# Patient Record
Sex: MALE | Race: ASIAN | URBAN - METROPOLITAN AREA
[De-identification: names, ages, dates, MRNs, and addresses within clinical notes are randomized per-mention and may not be internally consistent; named-entity substitution may affect disease eponyms.]

---

## 2017-02-08 ENCOUNTER — APPOINTMENT (OUTPATIENT)
Dept: GENERAL RADIOLOGY | Facility: CLINIC | Age: 37
DRG: 683 | End: 2017-02-08
Attending: PHYSICIAN ASSISTANT
Payer: OTHER MISCELLANEOUS

## 2017-02-08 ENCOUNTER — HOSPITAL ENCOUNTER (INPATIENT)
Facility: CLINIC | Age: 37
LOS: 1 days | Discharge: HOME | DRG: 683 | End: 2017-02-09
Attending: PHYSICIAN ASSISTANT | Admitting: INTERNAL MEDICINE
Payer: OTHER MISCELLANEOUS

## 2017-02-08 DIAGNOSIS — G89.29 CHRONIC RIGHT-SIDED BACK PAIN, UNSPECIFIED BACK LOCATION: ICD-10-CM

## 2017-02-08 DIAGNOSIS — N17.9 ACUTE RENAL FAILURE, UNSPECIFIED ACUTE RENAL FAILURE TYPE (H): ICD-10-CM

## 2017-02-08 DIAGNOSIS — E11.8 TYPE 2 DIABETES MELLITUS WITH COMPLICATION, WITHOUT LONG-TERM CURRENT USE OF INSULIN (H): Primary | ICD-10-CM

## 2017-02-08 DIAGNOSIS — M54.9 CHRONIC RIGHT-SIDED BACK PAIN, UNSPECIFIED BACK LOCATION: ICD-10-CM

## 2017-02-08 DIAGNOSIS — I95.1 ORTHOSTATIC HYPOTENSION: ICD-10-CM

## 2017-02-08 LAB
ALBUMIN SERPL-MCNC: 4 G/DL (ref 3.4–5)
ALBUMIN UR-MCNC: ABNORMAL MG/DL
ALP SERPL-CCNC: 60 U/L (ref 40–150)
ALT SERPL W P-5'-P-CCNC: 27 U/L (ref 0–70)
ANION GAP SERPL CALCULATED.3IONS-SCNC: 14 MMOL/L (ref 3–14)
APAP SERPL-MCNC: NORMAL MG/L (ref 10–20)
APPEARANCE UR: CLEAR
AST SERPL W P-5'-P-CCNC: 13 U/L (ref 0–45)
BASOPHILS # BLD AUTO: 0 10E9/L (ref 0–0.2)
BASOPHILS NFR BLD AUTO: 0.1 %
BILIRUB DIRECT SERPL-MCNC: 0.1 MG/DL (ref 0–0.2)
BILIRUB SERPL-MCNC: 0.7 MG/DL (ref 0.2–1.3)
BILIRUB UR QL STRIP: NEGATIVE
BUN SERPL-MCNC: 30 MG/DL (ref 7–30)
CALCIUM SERPL-MCNC: 8.6 MG/DL (ref 8.5–10.1)
CAOX CRY #/AREA URNS HPF: ABNORMAL /HPF
CHLORIDE SERPL-SCNC: 105 MMOL/L (ref 94–109)
CO2 SERPL-SCNC: 19 MMOL/L (ref 20–32)
COLOR UR AUTO: YELLOW
CREAT SERPL-MCNC: 5.03 MG/DL (ref 0.66–1.25)
DIFFERENTIAL METHOD BLD: ABNORMAL
EOSINOPHIL # BLD AUTO: 0.1 10E9/L (ref 0–0.7)
EOSINOPHIL NFR BLD AUTO: 0.3 %
ERYTHROCYTE [DISTWIDTH] IN BLOOD BY AUTOMATED COUNT: 14.7 % (ref 10–15)
GFR SERPL CREATININE-BSD FRML MDRD: 13 ML/MIN/1.7M2
GLUCOSE SERPL-MCNC: 206 MG/DL (ref 70–99)
GLUCOSE UR STRIP-MCNC: NEGATIVE MG/DL
HCT VFR BLD AUTO: 38.1 % (ref 40–53)
HGB BLD-MCNC: 12.4 G/DL (ref 13.3–17.7)
HGB UR QL STRIP: NEGATIVE
HYALINE CASTS #/AREA URNS LPF: ABNORMAL /LPF (ref 0–2)
IMM GRANULOCYTES # BLD: 0.1 10E9/L (ref 0–0.4)
IMM GRANULOCYTES NFR BLD: 0.6 %
INTERPRETATION ECG - MUSE: NORMAL
KETONES BLD-SCNC: 0.1 MMOL/L (ref 0–0.6)
KETONES UR STRIP-MCNC: NEGATIVE MG/DL
LACTATE BLD-SCNC: 1.7 MMOL/L (ref 0.7–2.1)
LEUKOCYTE ESTERASE UR QL STRIP: NEGATIVE
LYMPHOCYTES # BLD AUTO: 3.6 10E9/L (ref 0.8–5.3)
LYMPHOCYTES NFR BLD AUTO: 24.7 %
MCH RBC QN AUTO: 33.2 PG (ref 26.5–33)
MCHC RBC AUTO-ENTMCNC: 32.5 G/DL (ref 31.5–36.5)
MCV RBC AUTO: 102 FL (ref 78–100)
MONOCYTES # BLD AUTO: 1 10E9/L (ref 0–1.3)
MONOCYTES NFR BLD AUTO: 6.6 %
MUCOUS THREADS #/AREA URNS LPF: PRESENT /LPF
NEUTROPHILS # BLD AUTO: 9.8 10E9/L (ref 1.6–8.3)
NEUTROPHILS NFR BLD AUTO: 67.7 %
NITRATE UR QL: NEGATIVE
NRBC # BLD AUTO: 0 10*3/UL
NRBC BLD AUTO-RTO: 0 /100
PH UR STRIP: 5 PH (ref 5–7)
PLATELET # BLD AUTO: 288 10E9/L (ref 150–450)
POTASSIUM SERPL-SCNC: 4.3 MMOL/L (ref 3.4–5.3)
PROT SERPL-MCNC: 7.7 G/DL (ref 6.8–8.8)
RBC # BLD AUTO: 3.73 10E12/L (ref 4.4–5.9)
RBC #/AREA URNS AUTO: ABNORMAL /HPF (ref 0–2)
RENAL EPI CELLS #/AREA URNS HPF: ABNORMAL /HPF
SALICYLATES SERPL-MCNC: 3 MG/DL
SODIUM SERPL-SCNC: 138 MMOL/L (ref 133–144)
SP GR UR STRIP: 1.02 (ref 1–1.03)
URN SPEC COLLECT METH UR: ABNORMAL
UROBILINOGEN UR STRIP-ACNC: 0.2 EU/DL (ref 0.2–1)
WBC # BLD AUTO: 14.5 10E9/L (ref 4–11)
WBC #/AREA URNS AUTO: ABNORMAL /HPF (ref 0–2)

## 2017-02-08 PROCEDURE — 80329 ANALGESICS NON-OPIOID 1 OR 2: CPT | Performed by: PHYSICIAN ASSISTANT

## 2017-02-08 PROCEDURE — 96360 HYDRATION IV INFUSION INIT: CPT

## 2017-02-08 PROCEDURE — 82010 KETONE BODYS QUAN: CPT | Performed by: PHYSICIAN ASSISTANT

## 2017-02-08 PROCEDURE — 80076 HEPATIC FUNCTION PANEL: CPT | Performed by: PHYSICIAN ASSISTANT

## 2017-02-08 PROCEDURE — 80048 BASIC METABOLIC PNL TOTAL CA: CPT | Performed by: PHYSICIAN ASSISTANT

## 2017-02-08 PROCEDURE — 25000128 H RX IP 250 OP 636: Performed by: PHYSICIAN ASSISTANT

## 2017-02-08 PROCEDURE — 85025 COMPLETE CBC W/AUTO DIFF WBC: CPT | Performed by: PHYSICIAN ASSISTANT

## 2017-02-08 PROCEDURE — 83605 ASSAY OF LACTIC ACID: CPT | Performed by: PHYSICIAN ASSISTANT

## 2017-02-08 PROCEDURE — 99285 EMERGENCY DEPT VISIT HI MDM: CPT | Mod: 25

## 2017-02-08 PROCEDURE — 93005 ELECTROCARDIOGRAM TRACING: CPT

## 2017-02-08 PROCEDURE — 96361 HYDRATE IV INFUSION ADD-ON: CPT

## 2017-02-08 PROCEDURE — 25000128 H RX IP 250 OP 636: Performed by: INTERNAL MEDICINE

## 2017-02-08 PROCEDURE — 71020 XR CHEST 2 VW: CPT

## 2017-02-08 PROCEDURE — 99223 1ST HOSP IP/OBS HIGH 75: CPT | Mod: AI | Performed by: INTERNAL MEDICINE

## 2017-02-08 PROCEDURE — 81001 URINALYSIS AUTO W/SCOPE: CPT | Performed by: PHYSICIAN ASSISTANT

## 2017-02-08 PROCEDURE — 25000132 ZZH RX MED GY IP 250 OP 250 PS 637: Performed by: PHYSICIAN ASSISTANT

## 2017-02-08 PROCEDURE — 12000007 ZZH R&B INTERMEDIATE

## 2017-02-08 RX ORDER — DIVALPROEX SODIUM 500 MG/1
1500 TABLET, EXTENDED RELEASE ORAL AT BEDTIME
COMMUNITY

## 2017-02-08 RX ORDER — ACETAMINOPHEN 650 MG/1
650 SUPPOSITORY RECTAL EVERY 4 HOURS PRN
Status: DISCONTINUED | OUTPATIENT
Start: 2017-02-08 | End: 2017-02-09 | Stop reason: HOSPADM

## 2017-02-08 RX ORDER — NICOTINE POLACRILEX 4 MG
15-30 LOZENGE BUCCAL
Status: DISCONTINUED | OUTPATIENT
Start: 2017-02-08 | End: 2017-02-09 | Stop reason: HOSPADM

## 2017-02-08 RX ORDER — DEXTROSE MONOHYDRATE 25 G/50ML
25-50 INJECTION, SOLUTION INTRAVENOUS
Status: DISCONTINUED | OUTPATIENT
Start: 2017-02-08 | End: 2017-02-09 | Stop reason: HOSPADM

## 2017-02-08 RX ORDER — ONDANSETRON 2 MG/ML
4 INJECTION INTRAMUSCULAR; INTRAVENOUS EVERY 6 HOURS PRN
Status: DISCONTINUED | OUTPATIENT
Start: 2017-02-08 | End: 2017-02-09 | Stop reason: HOSPADM

## 2017-02-08 RX ORDER — PROCHLORPERAZINE 25 MG
25 SUPPOSITORY, RECTAL RECTAL EVERY 12 HOURS PRN
Status: DISCONTINUED | OUTPATIENT
Start: 2017-02-08 | End: 2017-02-09 | Stop reason: HOSPADM

## 2017-02-08 RX ORDER — SODIUM CHLORIDE 9 MG/ML
INJECTION, SOLUTION INTRAVENOUS CONTINUOUS
Status: DISCONTINUED | OUTPATIENT
Start: 2017-02-08 | End: 2017-02-09

## 2017-02-08 RX ORDER — RAMELTEON 8 MG/1
8 TABLET ORAL AT BEDTIME
COMMUNITY

## 2017-02-08 RX ORDER — PROCHLORPERAZINE MALEATE 5 MG
5-10 TABLET ORAL EVERY 6 HOURS PRN
Status: DISCONTINUED | OUTPATIENT
Start: 2017-02-08 | End: 2017-02-09 | Stop reason: HOSPADM

## 2017-02-08 RX ORDER — QUETIAPINE FUMARATE 300 MG/1
600 TABLET, FILM COATED ORAL AT BEDTIME
Status: DISCONTINUED | OUTPATIENT
Start: 2017-02-08 | End: 2017-02-09 | Stop reason: HOSPADM

## 2017-02-08 RX ORDER — OXYCODONE HYDROCHLORIDE 5 MG/1
5 TABLET ORAL ONCE
Status: COMPLETED | OUTPATIENT
Start: 2017-02-08 | End: 2017-02-08

## 2017-02-08 RX ORDER — OXYCODONE HYDROCHLORIDE 5 MG/1
5-10 TABLET ORAL
Status: DISCONTINUED | OUTPATIENT
Start: 2017-02-08 | End: 2017-02-09 | Stop reason: HOSPADM

## 2017-02-08 RX ORDER — NALOXONE HYDROCHLORIDE 0.4 MG/ML
.1-.4 INJECTION, SOLUTION INTRAMUSCULAR; INTRAVENOUS; SUBCUTANEOUS
Status: DISCONTINUED | OUTPATIENT
Start: 2017-02-08 | End: 2017-02-09 | Stop reason: HOSPADM

## 2017-02-08 RX ORDER — ACETAMINOPHEN 160 MG
TABLET,DISINTEGRATING ORAL
Status: DISPENSED
Start: 2017-02-08 | End: 2017-02-09

## 2017-02-08 RX ORDER — RAMELTEON 8 MG/1
8 TABLET ORAL AT BEDTIME
Status: DISCONTINUED | OUTPATIENT
Start: 2017-02-08 | End: 2017-02-09 | Stop reason: HOSPADM

## 2017-02-08 RX ORDER — ONDANSETRON 4 MG/1
4 TABLET, ORALLY DISINTEGRATING ORAL EVERY 6 HOURS PRN
Status: DISCONTINUED | OUTPATIENT
Start: 2017-02-08 | End: 2017-02-09 | Stop reason: HOSPADM

## 2017-02-08 RX ORDER — ACETAMINOPHEN 325 MG/1
650 TABLET ORAL EVERY 4 HOURS PRN
Status: DISCONTINUED | OUTPATIENT
Start: 2017-02-08 | End: 2017-02-09 | Stop reason: HOSPADM

## 2017-02-08 RX ORDER — DIVALPROEX SODIUM 500 MG/1
1500 TABLET, EXTENDED RELEASE ORAL AT BEDTIME
Status: DISCONTINUED | OUTPATIENT
Start: 2017-02-08 | End: 2017-02-09 | Stop reason: HOSPADM

## 2017-02-08 RX ADMIN — SODIUM CHLORIDE 1000 ML: 9 INJECTION, SOLUTION INTRAVENOUS at 18:52

## 2017-02-08 RX ADMIN — SODIUM CHLORIDE 1000 ML: 9 INJECTION, SOLUTION INTRAVENOUS at 20:30

## 2017-02-08 RX ADMIN — SODIUM CHLORIDE: 9 INJECTION, SOLUTION INTRAVENOUS at 23:48

## 2017-02-08 RX ADMIN — OXYCODONE HYDROCHLORIDE 5 MG: 5 TABLET ORAL at 22:46

## 2017-02-08 ASSESSMENT — ENCOUNTER SYMPTOMS
DIFFICULTY URINATING: 0
SHORTNESS OF BREATH: 0
COUGH: 0
FEVER: 0
DIZZINESS: 0
HEMATURIA: 0
FREQUENCY: 0
LIGHT-HEADEDNESS: 0
DYSURIA: 0

## 2017-02-08 NOTE — IP AVS SNAPSHOT
MRN:[id number]                      After Visit Summary   2/8/2017    Bobby Osborne    MRN: [id number]           Thank you!     Thank you for choosing [facility] for your care. Our goal is always to provide you with excellent care. Hearing back from our patients is one way we can continue to improve our services. Please take a few minutes to complete the written survey that you may receive in the mail after you visit with us. Thank you!        Patient Information     Date Of Birth          1980        About your hospital stay     You were admitted on:  February 8, 2017 You last received care in the:  [address] [facility]    You were discharged on:  February 9, 2017        Reason for your hospital stay       You were admitted to the hospital due to kidney failure. It is currently improving. You will need follow up with your primary care for blood work next week. Avoid using NSAIDs (naproxen, motrin....). Your metformin is also stopped at time of discharge until you follow up with your primary care doctor and blood work showing improved kidney function.                  Who to Call     For medical emergencies, please call 911.  For non-urgent questions about your medical care, please call your primary care provider or clinic, [phone]          Attending Provider     Provider    Elba Salas PA-C Melander, Ian M, MD       Primary Care Provider Office Phone # Fax #    Oleg Bo [phone]       [facility] [address] [facility] [address]  [facility] [address]        After Care Instructions     Activity       Your activity upon discharge: activity as tolerated            Diet       Follow this diet upon discharge: low carbohydrate diet                  Follow-up Appointments     Follow-up and recommended labs and tests        Follow up with primary care provider, OLEG BO, within 3-5 days for hospital follow- up.  The following labs/tests are  "recommended: basic metabolic panel to ensure kidney function is improving.  Also discuss with your primary care doctor when you can go back to your metformin.                  Pending Results     No orders found for last 2 day(s).            Statement of Approval     Ordered          17 1521  I have reviewed and agree with all the recommendations and orders detailed in this document.   EFFECTIVE NOW     Approved and electronically signed by:  Mary Summers MD             Admission Information        Provider Department Dept Phone    2017 Santiago Adame MD  [facility] [phone]      Your Vitals Were     Blood Pressure Pulse Temperature    120/79 mmHg 99 98.2  F (36.8  C) (Oral)    Respirations Height Weight    16 1.702 m (5' 7\") 96.163 kg (212 lb)    BMI (Body Mass Index) Pulse Oximetry       33.20 kg/m2 98%       MyChart Information     [employer] lets you send messages to your doctor, view your test results, renew your prescriptions, schedule appointments and more. To sign up, go to [email or web address] . Click on \"Log in\" on the left side of the screen, which will take you to the Welcome page. Then click on \"Sign up Now\" on the right side of the page.     You will be asked to enter the access code listed below, as well as some personal information. Please follow the directions to create your username and password.     Your access code is: [email or web address]  Expires: 2017 11:05 PM     Your access code will  in 90 days. If you need help or a new code, please call your [place] clinic or [phone].        Care EveryWhere ID     This is your Care EveryWhere ID. This could be used by other organizations to access your [place] medical records  [id number]           Review of your medicines      CONTINUE these medicines which may have CHANGED, or have new prescriptions. If we are uncertain of the size of tablets/capsules you have at home, strength may be listed as something that " might have changed.        Dose / Directions    glipiZIDE 5 MG 24 hr tablet   Commonly known as:  glipiZIDE XL   This may have changed:    - medication strength  - how much to take  - when to take this   Used for:  Type 2 diabetes mellitus with complication, without long-term current use of insulin (H)        Dose:  5 mg   Take 1 tablet (5 mg) by mouth daily   Quantity:  30 tablet   Refills:  0         CONTINUE these medicines which have NOT CHANGED        Dose / Directions    divalproex 500 MG 24 hr tablet   Commonly known as:  DEPAKOTE ER        Dose:  1500 mg   Take 1,500 mg by mouth At Bedtime   Refills:  0       OXYCODONE HCL PO        Dose:  5 mg   Take 5 mg by mouth every 6 hours as needed (pain. max of 4/day)   Refills:  0       ramelteon 8 MG tablet   Commonly known as:  ROZEREM        Dose:  8 mg   Take 8 mg by mouth At Bedtime   Refills:  0       SEROQUEL PO        Dose:  600 mg   Take 600 mg by mouth At Bedtime   Refills:  0         STOP taking     METFORMIN HCL PO           NAPROXEN PO                Where to get your medicines      These medications were sent to [id number] - [place], [place] - [address], [place] [address]     Phone:  [phone]    - glipiZIDE 5 MG 24 hr tablet             Protect others around you: Learn how to safely use, store and throw away your medicines at www.disposemymeds.org.             Medication List: This is a list of all your medications and when to take them. Check marks below indicate your daily home schedule. Keep this list as a reference.      Medications           Morning Afternoon Evening Bedtime As Needed    divalproex 500 MG 24 hr tablet   Commonly known as:  DEPAKOTE ER   Take 1,500 mg by mouth At Bedtime   Last time this was given:  1,500 mg on 2/9/2017 12:51 AM                                glipiZIDE 5 MG 24 hr tablet   Commonly known as:  glipiZIDE XL   Take 1 tablet (5 mg) by mouth daily                                 OXYCODONE HCL PO   Take 5 mg by mouth every 6 hours as needed (pain. max of 4/day)   Last time this was given:  5 mg on 2/9/2017 12:28 PM                                ramelteon 8 MG tablet   Commonly known as:  ROZEREM   Take 8 mg by mouth At Bedtime   Last time this was given:  8 mg on 2/9/2017 12:52 AM                                SEROQUEL PO   Take 600 mg by mouth At Bedtime   Last time this was given:  600 mg on 2/9/2017 12:51 AM

## 2017-02-08 NOTE — IP AVS SNAPSHOT
[address] [facility]    [address] [place] [address]    Phone:  [phone]                                       After Visit Summary   2/8/2017    Bobby Osborne    MRN: [id number]           After Visit Summary Signature Page     I have received my discharge instructions, and my questions have been answered. I have discussed any challenges I see with this plan with the nurse or doctor.    ..........................................................................................................................................  Patient/Patient Representative Signature      ..........................................................................................................................................  Patient Representative Print Name and Relationship to Patient    ..................................................               ................................................  Date                                            Time    ..........................................................................................................................................  Reviewed by Signature/Title    ...................................................              ..............................................  Date                                                            Time

## 2017-02-09 ENCOUNTER — APPOINTMENT (OUTPATIENT)
Dept: ULTRASOUND IMAGING | Facility: CLINIC | Age: 37
DRG: 683 | End: 2017-02-09
Attending: INTERNAL MEDICINE
Payer: OTHER MISCELLANEOUS

## 2017-02-09 VITALS
WEIGHT: 212 LBS | DIASTOLIC BLOOD PRESSURE: 79 MMHG | OXYGEN SATURATION: 98 % | RESPIRATION RATE: 16 BRPM | TEMPERATURE: 98.2 F | HEART RATE: 99 BPM | HEIGHT: 67 IN | BODY MASS INDEX: 33.27 KG/M2 | SYSTOLIC BLOOD PRESSURE: 120 MMHG

## 2017-02-09 LAB
ANION GAP SERPL CALCULATED.3IONS-SCNC: 9 MMOL/L (ref 3–14)
BUN SERPL-MCNC: 26 MG/DL (ref 7–30)
CALCIUM SERPL-MCNC: 8.1 MG/DL (ref 8.5–10.1)
CHLORIDE SERPL-SCNC: 115 MMOL/L (ref 94–109)
CO2 SERPL-SCNC: 21 MMOL/L (ref 20–32)
CREAT SERPL-MCNC: 2.34 MG/DL (ref 0.66–1.25)
ERYTHROCYTE [DISTWIDTH] IN BLOOD BY AUTOMATED COUNT: 14.8 % (ref 10–15)
GFR SERPL CREATININE-BSD FRML MDRD: 31 ML/MIN/1.7M2
GLUCOSE BLDC GLUCOMTR-MCNC: 126 MG/DL (ref 70–99)
GLUCOSE BLDC GLUCOMTR-MCNC: 137 MG/DL (ref 70–99)
GLUCOSE BLDC GLUCOMTR-MCNC: 150 MG/DL (ref 70–99)
GLUCOSE SERPL-MCNC: 74 MG/DL (ref 70–99)
HBA1C MFR BLD: 6.8 % (ref 4.3–6)
HCT VFR BLD AUTO: 31.2 % (ref 40–53)
HGB BLD-MCNC: 10.1 G/DL (ref 13.3–17.7)
MCH RBC QN AUTO: 32.9 PG (ref 26.5–33)
MCHC RBC AUTO-ENTMCNC: 32.4 G/DL (ref 31.5–36.5)
MCV RBC AUTO: 102 FL (ref 78–100)
PLATELET # BLD AUTO: 231 10E9/L (ref 150–450)
POTASSIUM SERPL-SCNC: 4.8 MMOL/L (ref 3.4–5.3)
RBC # BLD AUTO: 3.07 10E12/L (ref 4.4–5.9)
SODIUM SERPL-SCNC: 145 MMOL/L (ref 133–144)
WBC # BLD AUTO: 8 10E9/L (ref 4–11)

## 2017-02-09 PROCEDURE — 80048 BASIC METABOLIC PNL TOTAL CA: CPT | Performed by: INTERNAL MEDICINE

## 2017-02-09 PROCEDURE — 85027 COMPLETE CBC AUTOMATED: CPT | Performed by: INTERNAL MEDICINE

## 2017-02-09 PROCEDURE — 76770 US EXAM ABDO BACK WALL COMP: CPT

## 2017-02-09 PROCEDURE — 25000132 ZZH RX MED GY IP 250 OP 250 PS 637: Performed by: INTERNAL MEDICINE

## 2017-02-09 PROCEDURE — 00000146 ZZHCL STATISTIC GLUCOSE BY METER IP

## 2017-02-09 PROCEDURE — 83036 HEMOGLOBIN GLYCOSYLATED A1C: CPT | Performed by: INTERNAL MEDICINE

## 2017-02-09 PROCEDURE — 36415 COLL VENOUS BLD VENIPUNCTURE: CPT | Performed by: INTERNAL MEDICINE

## 2017-02-09 PROCEDURE — 27210995 ZZH RX 272: Performed by: INTERNAL MEDICINE

## 2017-02-09 PROCEDURE — 99238 HOSP IP/OBS DSCHRG MGMT 30/<: CPT | Performed by: INTERNAL MEDICINE

## 2017-02-09 RX ORDER — GLIPIZIDE 5 MG/1
5 TABLET, FILM COATED, EXTENDED RELEASE ORAL DAILY
Qty: 30 TABLET | Refills: 0 | Status: SHIPPED | OUTPATIENT
Start: 2017-02-09

## 2017-02-09 RX ORDER — SODIUM CHLORIDE 450 MG/100ML
INJECTION, SOLUTION INTRAVENOUS CONTINUOUS
Status: DISCONTINUED | OUTPATIENT
Start: 2017-02-09 | End: 2017-02-09 | Stop reason: HOSPADM

## 2017-02-09 RX ADMIN — RAMELTEON 8 MG: 8 TABLET, FILM COATED ORAL at 00:52

## 2017-02-09 RX ADMIN — OXYCODONE HYDROCHLORIDE 5 MG: 5 TABLET ORAL at 12:28

## 2017-02-09 RX ADMIN — QUETIAPINE 600 MG: 300 TABLET, FILM COATED ORAL at 00:51

## 2017-02-09 RX ADMIN — DIVALPROEX SODIUM 1500 MG: 500 TABLET, EXTENDED RELEASE ORAL at 00:51

## 2017-02-09 RX ADMIN — SODIUM CHLORIDE: 4.5 INJECTION, SOLUTION INTRAVENOUS at 14:42

## 2017-02-09 RX ADMIN — ACETAMINOPHEN 650 MG: 325 TABLET, FILM COATED ORAL at 12:28

## 2017-02-09 ASSESSMENT — ACTIVITIES OF DAILY LIVING (ADL)
BATHING: 0-->INDEPENDENT
RETIRED_EATING: 0-->INDEPENDENT
DRESS: 0-->INDEPENDENT
COGNITION: 0 - NO COGNITION ISSUES REPORTED
FALL_HISTORY_WITHIN_LAST_SIX_MONTHS: NO
RETIRED_COMMUNICATION: 0-->UNDERSTANDS/COMMUNICATES WITHOUT DIFFICULTY
TOILETING: 0-->INDEPENDENT
AMBULATION: 0-->INDEPENDENT
TRANSFERRING: 0-->INDEPENDENT
SWALLOWING: 0-->SWALLOWS FOODS/LIQUIDS WITHOUT DIFFICULTY

## 2017-02-09 NOTE — PLAN OF CARE
Problem: Goal Outcome Summary  Goal: Goal Outcome Summary  Outcome: Improving  Up with SBA and one crutch to bathroom.   Voiding adequately.  Taking oxycodone and tylenol for pain with adequate relief.  Blood sugars today 74 and 137.  Ok to eat extra hamburger and fries this afternoon per md after patient had lunch.  No need to check blood sugar prior per md.  Will continue to assess.

## 2017-02-09 NOTE — H&P
DATE OF ADMISSION AND SERVICE:  02/08/2017.      PRIMARY CARE PROVIDER:  Oleg Bo MD.      CHIEF COMPLAINT:  Low blood pressure.      HISTORY OF PRESENT ILLNESS:  Bobby Osborne is a 36-year-old male with past medical history as detailed below who presents with the above chief complaint.      The patient was sent here from [facility] where he had presented to have a scheduled cortisone shot for chronic right-sided sciatic type pain.  There, his blood pressure was noted to be in the 70s systolic and, therefore, he was referred to the Emergency Department for further evaluation.  In the Emergency Department, he was seen by Wendy Salas PA-C.  His initial temperature was 97 degrees Fahrenheit with a heart rate of 109 beats per minute.  Blood pressure was 84/48 and improved following a liter of fluids and 98/46.  Respiratory rate was 18 and he was oxygenating at 99% on room air.  Laboratory workup was notable for a CBC with white blood cell count of 14.5, hemoglobin of 12.4, MCV of 102 and platelet count 288.  Basic metabolic panel demonstrated normal sodium and potassium.  Bicarbonate was 19.  Glucose was 206.  Creatinine was 5.03 with BUN of 30.  Calcium was within normal limits at 8.6.  Quantitative ketones were 0.1, lactic acid is 1.7, salicylate level was 3, acetaminophen level was undetectable.  Urinalysis demonstrated trace protein, 0-2 white blood cells, 0-2 red blood cells, 5-10 hyaline casts, moderate renal tubular epithelial cells, calcium oxalate.  Hepatic panel was completely within normal limits.  Due to his acute renal failure, admission to the hospital was requested for further cares.      The patient reports that for the past 4-6 weeks, he has been taking naproxen twice daily for his sciatic nerve pain.  He is unsure of his dose, although, per review of Care Everywhere this appears to be 500 mg twice daily with a start date of 12/15/2016.  He otherwise denies using any additional NSAID.   "He otherwise has not had any recent changes in his medications.  He has hydrochlorothiazide and lisinopril listed in Care Everywhere, although, he does not report taking these.  He has not particularly noted any change in his urine output; he felt that his urine was slightly darker today but he attributes this to not having anything to eat or drink in anticipation of having his cortisone injection.  Prior to this he had been eating and drinking normally.  He reports he has 2 previous episodes of where his kidneys \"shut down\" due to taking too much over-the-counter pain medication.  He has never needed dialysis for this.      He reports in regards to his right leg pain that this first occurred after an injury moving boxes in 2015.  He had a cortisone injection around that time which lasted for about 9 months.  However, afterwards his pain and weakness had returned.  He has just now been able to get back in to be scheduled for the cortisone injection, which he was unable to get due to this hospitalization.  He reports his symptoms as pain and tingling starting in his low back extending down the back of his leg all the way to his feet.  He has had ongoing weakness associated with this present for several months with no change recently. He currently describes a headache throughout the entirety of his head.  There are no associated vision or new focal neurologic deficits and these are similar to headaches that he has at home.      PAST MEDICAL HISTORY:   1.  Diabetes mellitus type 2.   2.  Bipolar 1 disorder.   3.  Hypertension.   4.  Antisocial behavior.   5.  Tobacco abuse.   6.  Chronic right sciatica      SOCIAL HISTORY:  He reports he smokes from 1/2 a pack to 3/4 of a pack of cigarettes daily.  He was a former heavy drinker when he was in the [employer]; however, he currently reports his alcohol use is rare.  Denies any other IV or illicit drug use, although, per Care Everywhere has had a history of methamphetamine and " cocaine use as a teen.  He was previously in the [employer] and currently is working as a .      FAMILY HISTORY:  Unknown as the patient is adopted.  He does not know anything about his biological family.      REVIEW OF SYSTEMS:  Complete 10-point review of systems assessed and negative except as noted in the HPI.      ALLERGIES:  Penicillin with unknown reaction.      PHYSICAL EXAMINATION:   VITAL SIGNS:  Temperature 97 degrees Fahrenheit, heart rate 94, blood pressure 123/82, respiratory rate 16, SpO2 96% on room air.     GENERAL:  Middle-aged male in no acute distress.   HEENT:  Pupils equal, round, reactive to light, extraocular movements intact.   ENT:  Moist mucous membranes.  Oropharynx clear.   NECK:  Supple, no rigidity.   RESPIRATORY:  Lungs clear to auscultation bilaterally.  Normal effort.   CARDIOVASCULAR:  Regular rate and rhythm, no murmurs, rubs or gallops.  No peripheral edema.   GASTROINTESTINAL:  Soft, nontender, nondistended.  Bowel sounds normoactive.  No rebound tenderness or guarding.   SKIN:  Warm, dry.   PSYCHIATRIC:  Easily irritable.   NEUROLOGIC:  Alert.  Responding to questions appropriately.  5/5 lower extremity strength on left, strength exam on the right is limited by reports of tingling and mild pain and does not further cooperate.      DATA:  Labs and imaging reviewed in Epic.  Pertinents included in HPI.      ASSESSMENT AND PLAN:  Bobby Osborne is a 36-year-old male with past medical history significant for diabetes mellitus type 2, hypertension, bipolar disorder, chronic right-sided sciatica, insomnia, reported history of acute kidney injury who presents with asymptomatic low blood pressure, found to have acute kidney injury.  He is admitted on 02/08/2017, for further workup and treatment.     1.  Acute kidney injury:  His most recent creatinine available in [facility] is one from 2011, therefore, his recent baseline is not known.  He presents with a creatinine of 5.03  with a BUN of 30.  His urinalysis demonstrates moderate renal tubular epithelial cells and 5-10 hyaline casts, otherwise does not have any white blood cells or red blood cells present.  This is occurring in the setting of at least 6 weeks of daily Naproxen use.  He does not report any significant decrease in intake.  He has not had any obstructive type symptoms.  Suspect that this is likely to be an NSAID nephropathy.  Will consult Nephrology for further evaluation.  Will obtain a renal ultrasound.  We will monitor strict I's and O's.  He has a mild acidosis with a bicarbonate of 19 on his BMP, but otherwise, his electrolytes are currently within normal limits.  He has no overt hypervolemia, denies any uremic symptoms.   2.  Chronic right-sided sciatica:  This has been an issue for him since 2015.  Initially responded well to a cortisone injection; however, symptoms returned after several months and he is now pursuing another injection.  He was unable to complete this due to the low blood pressure noted that prompted his being sent to the Emergency Department.  I have requested to obtain outside records from [facility] and once obtained, if specific level of injection is known, can pursue this for him while he is here as he will not be able to use NSAIDs moving forward as he was using to manage his pain.  If it is not clear from [facility] records where his injection should take place, then can involve spine surgery in order to provide symptomatic relief for him while he is hospitalized.  Will avoid all NSAIDs and have oxycodone available as needed for severe pain.   3.  Headache:  He currently reports a headache throughout the entirety of his head.  There is no associated vision changes or new neurologic deficits with this.  He reports that he gets headaches similar to this at home.  Will have as needed acetaminophen and oxycodone available for him.  He has not smoked in several days and nicotine withdrawal may be  a component of this.   4.  Tobacco abuse:  He is a current half pack to 3/4 pack per day smoker.  He reports he has not smoked for several days.  Despite this, he is declining a nicotine patch being ordered, although, I encouraged him to use this and if needed, he can request at a later time for this to be ordered.   5.  Diabetes mellitus type 2:  Awaiting a call to his pharmacy from our pharmacy services to confirm, although, believes he is on metformin and glipizide.  These will be held, especially the metformin given his current renal dysfunction.  Will manage with sliding scale insulin while he is hospitalized.   6.  Bipolar disorder:  Managed through [facility].  Will continue his prior to admission, quetiapine and divalproex.  These do not typically require adjustments in renal dysfunction.   7.  Insomnia:  Will continue his prior to admission ramelteon.  Discussed with pharmacy and this is a medication that acts on melatonin, therefore, if it is not available can be changed to just plain melatonin while he is here.     Deep venous thrombosis prophylaxis:  Heparin subcutaneously.     Code status:  Full code.      DISPOSITION:  Will admit to inpatient status.  Anticipate greater than or equal to 2 midnights prior to discharge.         BRITTA ROJAS MD             D: 2017 23:21   T: 2017 00:18   MT:       Name:     SOSA BLACK   MRN:      [id number]        Account:      [id number]   :      1980           Admitted:     [id number]      Document: [id number]       cc: Oleg Bo MD

## 2017-02-09 NOTE — PLAN OF CARE
Problem: Goal Outcome Summary  Goal: Goal Outcome Summary  Outcome: Adequate for Discharge Date Met:  02/09/17  Discharge instructions given, medications reviewed and all questions answered. Pt ready and adequate for d/c home.

## 2017-02-09 NOTE — ED PROVIDER NOTES
"  History     Chief Complaint:  Hypotension    HPI   Bobby Osborne is a 36 year old male with a history of diabetes who presents to the emergency department today for evaluation of hypotension. The patient was going to [facility] today for a chronic back from a previous injury when they noted his blood pressure was low. The patient then was told to come into the ED. He has no dizziness, lightheadedness, shortness of breath, fever, cough, or urinary symptoms. The patient checked his blood sugar which was 200 last night and 68 this morning.     Allergies:  Penicillin G      Medications:    Depakote  Oxycodone  Seroquel  Metformin  Naproxen  Glipizide  Rozerem       Past Medical History:    Bipolar 1 Disorder  Hypertension  Diabetes     Past Surgical History:    History reviewed. No pertinent past surgical history.     Family History:    History reviewed. No pertinent family history.      Social History:  Smoking Status: current every day smoker  Alcohol Use: positive   Marital Status:  Single [1]    Review of Systems   Constitutional: Negative for fever.   Respiratory: Negative for cough and shortness of breath.    Genitourinary: Negative for dysuria, urgency, frequency, hematuria and difficulty urinating.   Neurological: Negative for dizziness and light-headedness.   All other systems reviewed and are negative.    Physical Exam   Vitals:  Patient Vitals for the past 24 hrs:   BP Temp Temp src Pulse Resp SpO2 Height Weight   02/08/17 2114 123/62 mmHg - - 101 - 99 % - -   02/08/17 2030 112/62 mmHg - - 96 15 100 % - -   02/08/17 2000 121/61 mmHg - - 101 - 100 % - -   02/08/17 1930 98/67 mmHg - - 102 - 100 % - -   02/08/17 1900 91/58 mmHg - - - - 99 % - -   02/08/17 1800 98/46 mmHg - - 105 18 98 % - -   02/08/17 1627 (!) 84/48 mmHg 97  F (36.1  C) Temporal 109 18 99 % 1.702 m (5' 7\") 96.163 kg (212 lb)      Physical Exam  General: Alert, interactive, appears comfortable    Eye:  Pupils are equal, round, and reactive. "     ENT:     No rhinorrhea.     Moist mucus membranes.    Neck: No rigidity.               Cardiac:  Regular rate and rhythm. S1, S2.  No murmurs, gallops, or rubs.    Pulmonary:  Clear to auscultation bilaterally.  No wheezes or crackles.             Non labored. No use of accessory muscles.     Abdomen:  Abdomen is soft and non-distended, without focal tenderness.     Musculoskeletal:  Freely moveable extremities with pain in the right hip and low back with sitting forward. No midline tenderness of the lumbar region. Right upper gluteus tenderness. Allows for PROM of the right hip.     Skin:  Warm and dry without rashes.No leg edema.     Neurologic:  Dorsiflexion and plantarflexion of the feet intact.     Psychiatric:  Awake. Normal affect with appropriate interaction with examiner.      Emergency Department Course     ECG:  ECG taken at 2029, ECG read at 2050  Sinus rhythm  Right superior axis deviation  ST & T wave abnormality, consider inferior ischemia  Abnormal ECG  Rate 98 bpm. AK interval 140. QRS duration 94. QT/QTc 358/457. P-R-T axes * 192 181.     Imaging:  Radiology findings were communicated with the patient who voiced understanding of the findings.    Chest x-ray:  IMPRESSION: No active disease. Scoliosis noted.  Reading per radiology    Laboratory:  Laboratory findings were communicated with the patient who voiced understanding of the findings.    BMP: Glucose: 206 (H), Carbon Dioxide: 19 (L), GFR: 13 (L) o/w WNL (Creatinine 5.03 (H))  CBC: WBC 14.5 (H), HGB 12.4 (L),   Ketone quantitative: 0.1  Lactic Acid (Collected at 2030): 1.7   Acetaminophen level: <2  Salicylate level: 3   UA: Protein Albumin: Trace (A)  Urine Microscopic: Hyaline Casts: 5-10 (A), Renal Tub Epi: Moderate (A), Calcium Oxalate: Few (A), Mucous: Present (A)     Interventions:  1852 NS 1000 ml IV       Emergency Department Course:  Nursing notes and vitals reviewed.  I performed an exam of the patient as documented above.    IV was inserted and blood was drawn for laboratory testing, results above.  The patient provided a urine sample here in the emergency department. This was sent for laboratory testing, findings above.  The patient was sent for a chest x-ray while in the emergency department, results above.  9:48 PM: I spoke with Dr. Adame of the hospitalist service regarding patient's presentation, findings, and plan of care.   I discussed the treatment plan with the patient. They expressed understanding of this plan and consented to admission. I discussed the patient with Dr. Adame, who will admit the patient to a monitored bed for further evaluation and treatment.   I personally reviewed the laboratory results with the patient and answered all related questions prior to admission.    Impression & Plan      Medical Decision Making:  Bobby Osborne is a 36 year old male with known diabetes and bipolar disorder who presents for evaluation of asymptomatic hypotension and found to be in acute renal failure. On initial exam the patient is hypotensive but asymptomatic. He denies any chest pain, shortness of breath, lightheadedness, dizziness, abdominal pain, or urinary symptoms. He has been dealing with ongoing right lower back pain which I suspect is secondary to sciatica. Per chart review, I see that he was evaluated in the ED in December of 2016 numerous times for this. Today he presented to [facility] orthopedics for an injection, and they would not do it given his hypotension and recommended he come to the ER. The patient has no mid line spinal tenderness, denies any recent trauma, and states that he has had an old injury. He has palpable tenderness to the right gluteal region. Clinically this is most likely sciatica. He is afebrile again with no midline tenderness. I doubt this is underlying discitis or epidural abscess. His hypotension has improved here in the ED with IV hydration. I did not feel further back imaging was  "emergent. The patient was found to have an elevated WBC count as well as elevated renal function with a creatinine of 5.03 and GFR of 13. Given his elevated white count and hypotension, I did consider underlying sepsis. His lactate acid is within normal limits as well as his ketones.UA is unremarkable for infection. His chest x-ray shows no evidence of pneumonia. After further questioning of the patient, he states his kidney's have \"shut down before\" which he attributed to medication use. He tells me that he has been taking Tylenol regularly as well as Naproxen for his pain. He states that he has been taking them as prescribed. Acetaminophen and salicylate are within normal limits. The patient will need to be admitted for further treatment and evaluation of his acute renal failure. I spoke with Dr. Adame of the hospitalist service who accepts patient care.     Diagnosis:    ICD-10-CM    1. Acute renal failure, unspecified acute renal failure type (H) N17.9    2. Orthostatic hypotension I95.1    3. Chronic right-sided back pain, unspecified back location M54.9     G89.29      Disposition:   Admission    Scribe Disclosure:  I, Alireza Judd, am serving as a scribe at 6:38 PM on 2/8/2017 to document services personally performed by Elba Salas PA, based on my observations and the provider's statements to me.   2/8/2017    EMERGENCY DEPARTMENT        Elba Salas PA-C  02/08/17 2336  "

## 2017-02-09 NOTE — CONSULTS
IDENTIFICATION:  Bobby Osborne is a 36-year-old male admitted through the Emergency Room on 02/08/2017 in the setting of hypotension and apparent acute on chronic renal insufficiency.      REASON FOR CONSULTATION:  Evaluation and assessment with respect to elevated serum creatinine.      REQUESTING PHYSICIAN:  Dr. Santiago Adame      IMPRESSION:   1.  Presumed near-baseline-normal renal function.  No historical data for review.   2.  Previous history of apparent acute kidney injury in the setting of nonsteroidal anti-inflammatory use, by patient report.  Both of these episodes have occurred apparently within the past 10 years.  He cannot provide any specific details in this regard or specific timeframe.   3.  Acute kidney injury.  Evaluation has included negative renal ultrasound, urinalysis which is bland with the exception of renal tubular epithelial cells and hyalin casts.  His history suggests, again, nonsteroidal anti-inflammatory use as a contributing factor.  Naprosyn twice daily for the past 6-7 weeks.   4.  Modest metabolic acidosis which has improved with hydration.   5.  Developing hypernatremia in the setting of saline infusion.   6.  Elevated hemoglobin A1c, suggesting abnormal glucose metabolism.   7.  Hypotension, improved.   8.  Ongoing tobacco use.   10.  Bipolar disorder, uncertain history with respect to apparent previous lithium use.  We do note his urine to be somewhat concentrated at 1.020 specific gravity, suggesting perhaps no significant urine concentrating deficiency.        DISCUSSION:  Young man with a history of acute kidney injury related to nonsteroidals who presents with an elevated creatinine in the setting of Naprosyn 500 mg twice daily, dating back to 12/15/2016.  Almost certainly this is a contributing component.  His urinalysis demonstrates renal tubular epithelial cells which is consistent with tubular injury and/or acute tubular necrosis.  He has, however, responded rapidly to  saline infusion.  Suggests that, in part, at least some volume-related component.  His creatinine has decreased from 5 to 2.34.      He should not be taking nonsteroidals under any circumstances.        PLAN:     1.  Decrease saline infusion rate.    2.  Further education that he under no circumstances should take nonsteroidal anti-inflammatory medications, given his history.   3.  No real reason this patient needs to remain hospitalized.  He could be discharged with primary care followup and laboratory studies early next week.   4.  Avoid further nephrotoxic agents.   5.  Other medical problems, per direction of the Hospitalist Service.      HISTORY:  The patient sent from [facility] after being evaluated in the setting of scheduled cortisone injection for chronic right-sided sciatica-type pain.  There, he was found have a blood pressure in the 70s.  Upon evaluation in the Emergency Room, blood pressure was noted to be 84/48.  He did respond to fluid infusion.  His creatinine was noted to be 5.03, serum bicarbonate of 19.  He was admitted and hydrated overnight.  His creatinine today is 2.34.  His bicarbonate is 21.      Diagnostic evaluation has included ultrasound showing bilateral symmetric kidneys.  His urinalysis is relatively bland without significant proteinuria.  Has renal epithelial cells and casts.  No urine sodium for review.      He is awake, alert and appropriate.  His affect is off.  He does answer questions.  Cannot provide specific details, and as such I would describe him as a poor historian.      PAST MEDICAL HISTORY:   1.  Bipolar disorder.   2.  Hypertension, by history.   3.  Diabetes.   4.  Tobacco use.   5.  Chronic right sciatica.   6.  History of antisocial behavior.      HOME MEDICATIONS:  Reviewed includin.  Depakote.   2.  Oxycodone.     3.  Seroquel.     4.  Metformin.     5.  Naproxen.   6.  Glipizide.    7.  Rozerem.       ALLERGIES:  Penicillin.      SOCIAL HISTORY:   Ongoing alcohol, tobacco.  Single.      FAMILY HISTORY:  Noncontributory.      REVIEW OF SYSTEMS:  Complete 10-point review of systems is undertaken.  Pertinent positives and negatives are as noted above.  I would reiterate, he is urinating, but I's and O's have not been completed during his hospitalization.      PHYSICAL EXAMINATION:   VITAL SIGNS:  He is afebrile.  His blood pressure now in the range of 100-120 over 60-80, his rhythm is sinus, his rate is 100, sats are adequate on room air.  His respiratory rate is mid-teens.   GENERAL:  No acute distress, appropriate.   HEENT:  Unremarkable.  His pharynx is without lesion, perhaps slightly dry.   NECK:  Supple.   CHEST:  Symmetric and clear.   CARDIOVASCULAR:  Regular.   ABDOMEN:  Soft.   EXTREMITIES:  No edema.   NEUROLOGIC:  Flat affect.      LABORATORY DATA:  Current and historical reviewed in detail.  Diagnostic imaging studies noted.         EFRAIN DAVID MD             D: 2017 11:47   T: 2017 12:22   MT: MEMO      Name:     SOSA BLACK   MRN:      [id number]        Account:       [id number]   :      1980           Consult Date:  2017      Document: [id number]       cc: Santiago Adame MD

## 2017-02-09 NOTE — ED NOTES
[facility]  ED Nurse Handoff Report    ED Chief complaint: Hypotension      ED Diagnosis:   Final diagnoses:   Acute renal failure, unspecified acute renal failure type (H)   Orthostatic hypotension   Chronic right-sided back pain, unspecified back location       Code Status: Full Code    Allergies:   Allergies   Allergen Reactions     Penicillin G        Activity level:  Independent     Needed?: No    Isolation: No  Infection: Not Applicable    Bariatric?: No      Vital Signs:   Filed Vitals:    02/08/17 2000 02/08/17 2030 02/08/17 2114 02/08/17 2130   BP: 121/61 112/62 123/62 123/68   Pulse: 101 96 101 95   Temp:       TempSrc:       Resp:  15  14   Height:       Weight:       SpO2: 100% 100% 99% 100%       Cardiac Rhythm: ,        Pain level: 0-10 Pain Scale: 9    Is this patient confused?: No    Patient Report: Initial Complaint: Sent here from ortho after he was found to be hypotensive prior to steroid injection.  Focused Assessment: Pt denies dizziness or lightheadedness, he was at [facility] to have a steroid injection for ongoing R ankle pain, they found his BP to be 80 systolic and sent him to ED.  Pt given 2L NS, labs drawn and he was found to have creatinine of 5.  He is diabetic and states his sugars have been high, pt states he drinks no water and only soda.  BGM was 206 here.  Pt was tachy at 115, but has resolved after fluids.  Pt is up with SBA and a crutch.  Acetaminophen, Salicylate and hepatic panel are pending.  Pt is AOx4, VSS after fluids.  Tests Performed: CBC, BMP, Hepatic panel, Tylenol, salicylate, lactic acid  Abnormal Results: Creatinine 5.03, WBC 14.5  Treatments provided: 2L NS    Family Comments: none here, states he contacted his yvrose    OBS brochure/video discussed/provided to patient: No    ED Medications:   Medications   0.9% sodium chloride BOLUS (0 mLs Intravenous Stopped 2/8/17 2029)   0.9% sodium chloride BOLUS (1,000 mLs Intravenous New Bag 2/8/17 2030)        Drips infusing?:  No      ED NURSE PHONE NUMBER: [phone]

## 2017-02-09 NOTE — DISCHARGE SUMMARY
[facility]    Discharge Summary  Hospitalist    Date of Admission:  2/8/2017  Date of Discharge:  2/9/2017  Discharging Provider: Mary Summers  Date of Service (when I saw the patient): 02/09/2017    Discharge Diagnoses  Acute kidney injury  Metabolic acidosis    Chronic right sided sciatica  Type II DM  Bipolar disorder  Tobacco abuse  Insomnia      History of Present Illness  Bobby Osborne is a 36-year-old male with past medical history significant for diabetes mellitus type 2, hypertension, bipolar disorder, chronic right-sided sciatica, insomnia, reported history of acute kidney injury who presents with asymptomatic low blood pressure, found to have acute kidney injury.  He is admitted on 02/08/2017, for further workup and treatment.  See H & P for detail.     Hospital Course  Bobby Osborne was admitted on 2/8/2017.  The following problems were addressed during his hospitalization:    Acute kidney injury:    Mild metabolic acidosis: resolved.   This is likely multifactorial from use of NSAIDs and also component of dehydration given that renal function has responded to IVF. Creatinine was 5.01 on admission and improved to 2.34 with IVF. He is making urine. Renal US was normal. He is evaluated by nephrology this stay.  He is advised to avoid NSAIDs in the future. Will discharge home with PCP follow up in the next 3-5 days for repeat blood work to ensure improving renal function. Patient declined care coordinator's offer to make PCP appointment.      Chronic right-sided sciatica:  This has been an issue for him since 2015.  Initially responded well to a cortisone injection; however, symptoms returned after several months and he is now pursuing another injection.  this was not completed due to his hospital admission yesterday and can be done anytime after discharge. Advised to avoid NSAIDs in the future.  Advised to continue his PTA oxycodone and tylenol for pain control.     Diabetes  mellitus type 2:    Metformin discontinued given his renal dysfunction, can be resumed at follow up appointment with PCP if renal function normalizes. Given his renal function, also decreased his glipizide XR to 5mg daily. A1c is 6.8.      Bipolar disorder:  Managed through [facility].  We will continue his prior to admission, quetiapine and divalproex.      Insomnia:  We will continue his prior to admission ramelteon.      Mary Summers    Significant Results and Procedures  Creatinine 5.03>>2.34  Normal renal US      Pending Results    Unresulted Labs Ordered in the Past 30 Days of this Admission     No orders found for last 60 day(s).          Code Status  Full Code       Primary Care Physician  ASHLEY STANFORD    Physical Exam  Temp: 98.2  F (36.8  C) Temp src: Oral BP: 120/79 mmHg Pulse: 99   Resp: 16 SpO2: 98 % O2 Device: None (Room air)    Filed Vitals:    02/08/17 1627   Weight: 96.163 kg (212 lb)     Vital Signs with Ranges  Temp:  [97  F (36.1  C)-98.4  F (36.9  C)] 98.2  F (36.8  C)  Pulse:  [] 99  Resp:  [14-18] 16  BP: ()/(46-82) 120/79 mmHg  SpO2:  [98 %-100 %] 98 %  I/O last 3 completed shifts:  In: 1550 [P.O.:750; I.V.:800]  Out: -     General: alert, awake and no apparent distress  CVS: regular rate and rhythm  Abd: soft, NT, ND  Ext: No LE edema    Discharge Disposition  Discharged to home  Condition at discharge: Stable    Consultations This Hospital Stay  NEPHROLOGY IP CONSULT    Time Spent on This Encounter  Mary OHARA, personally saw the patient today and spent 30 minutes discharging this patient.    Discharge Orders    Follow-up and recommended labs and tests    Follow up with primary care provider, ASHLEY STANFORD, within 3-5 days for hospital follow- up.  The following labs/tests are recommended: basic metabolic panel to ensure kidney function is improving.  Also discuss with your primary care doctor when you can go back to your metformin.     Activity   Your activity  upon discharge: activity as tolerated     Reason for your hospital stay   You were admitted to the hospital due to kidney failure. It is currently improving. You will need follow up with your primary care for blood work next week. Avoid using NSAIDs (naproxen, motrin....). Your metformin is also stopped at time of discharge until you follow up with your primary care doctor and blood work showing improved kidney function.     Full Code     Diet   Follow this diet upon discharge: low carbohydrate diet       Discharge Medications  Current Discharge Medication List      CONTINUE these medications which have CHANGED    Details   glipiZIDE (GLIPIZIDE XL) 5 MG 24 hr tablet Take 1 tablet (5 mg) by mouth daily  Qty: 30 tablet, Refills: 0    Associated Diagnoses: Type 2 diabetes mellitus with complication, without long-term current use of insulin (H)         CONTINUE these medications which have NOT CHANGED    Details   OXYCODONE HCL PO Take 5 mg by mouth every 6 hours as needed (pain. max of 4/day)      QUEtiapine Fumarate (SEROQUEL PO) Take 600 mg by mouth At Bedtime       divalproex (DEPAKOTE ER) 500 MG 24 hr tablet Take 1,500 mg by mouth At Bedtime      ramelteon (ROZEREM) 8 MG tablet Take 8 mg by mouth At Bedtime         STOP taking these medications       NAPROXEN PO Comments:   Reason for Stopping:         METFORMIN HCL PO Comments:   Reason for Stopping:             Allergies  Allergies   Allergen Reactions     Penicillin G      Data  Most Recent 3 CBC's:  Recent Labs   Lab Test  02/09/17   0620  02/08/17   1845   WBC  8.0  14.5*   HGB  10.1*  12.4*   MCV  102*  102*   PLT  231  288      Most Recent 3 BMP's:  Recent Labs   Lab Test  02/09/17   0620  02/08/17   1845   NA  145*  138   POTASSIUM  4.8  4.3   CHLORIDE  115*  105   CO2  21  19*   BUN  26  30   CR  2.34*  5.03*   ANIONGAP  9  14   RENNY  8.1*  8.6   GLC  74  206*     Most Recent 2 LFT's:  Recent Labs   Lab Test  02/08/17   1845   AST  13   ALT  27   ALKPHOS   60   BILITOTAL  0.7     Most Recent INR's and Anticoagulation Dosing History:  Anticoagulation Dose History     There is no flowsheet data to display.        Most Recent 3 Troponin's:No lab results found.  Most Recent Cholesterol Panel:No lab results found.  Most Recent 6 Bacteria Isolates From Any Culture (See EPIC Reports for Culture Details):No lab results found.  Most Recent TSH, T4 and A1c Labs:  Recent Labs   Lab Test  02/09/17   0620   A1C  6.8*     Results for orders placed or performed during the hospital encounter of 02/08/17   Chest XR,  PA & LAT    Narrative    CHEST TWO VIEWS 2/8/2017 8:40 PM     HISTORY: Elevated white blood cell count.    COMPARISON: None.      Impression    IMPRESSION: No active disease. Scoliosis noted.    OLIVIA ALCANTAR MD   US Renal Complete    Narrative    US RENAL COMPLETE  2/9/2017 1:20 AM      HISTORY: Acute kidney injury.     COMPARISON: None.    FINDINGS: The kidneys are normal in size, shape, and position. The  right kidney measures 12.2 x 5.9 x 6.0 cm with a cortical thickness of  2.1 cm. The left kidney measures 11.4 x 5.0 x 5.3 cm with a cortical  thickness of 1.7 cm. There is no renal mass, cyst, stone or collecting  system dilatation. The urinary bladder is well distended and appears  normal.      Impression    IMPRESSION: Normal renal ultrasound.    BRANDON MARTELL MD

## 2017-02-09 NOTE — PROGRESS NOTES
Met with pt. Pt stated he doesn't have a ride to appts. Refused to allow writer to make PCP f/u appt. He stated he will make it so he can work with his parents schedule.

## 2017-02-09 NOTE — PHARMACY-ADMISSION MEDICATION HISTORY
Admission medication history interview status for the 2/8/2017  admission is complete. See EPIC admission navigator for prior to admission medications     Medication history source reliability:Good    Actions taken by pharmacist (provider contacted, etc): interviewed patient and called Rx to clarify glipizide, naproxen & oxycodone.     Additional medication history information not noted on PTA med list :None    Medication reconciliation/reorder completed by provider prior to medication history? Yes    Time spent in this activity: 15 minutes    Prior to Admission medications    Medication Sig Last Dose Taking? Auth Provider   GLIPIZIDE XL PO Take 10 mg by mouth daily (with breakfast) 2/7/2017 at am Yes Unknown, Entered By History   NAPROXEN PO Take 500 mg by mouth 2 times daily (with meals) 2/7/2017 Yes Unknown, Entered By History   OXYCODONE HCL PO Take 5 mg by mouth every 6 hours as needed (pain. max of 4/day)  Yes Unknown, Entered By History   QUEtiapine Fumarate (SEROQUEL PO) Take 600 mg by mouth At Bedtime  2/7/2017 at Unknown time Yes Reported, Patient   divalproex (DEPAKOTE ER) 500 MG 24 hr tablet Take 1,500 mg by mouth At Bedtime 2/7/2017 at Unknown time Yes Unknown, Entered By History   ramelteon (ROZEREM) 8 MG tablet Take 8 mg by mouth At Bedtime 2/7/2017 at Unknown time Yes Unknown, Entered By History   METFORMIN HCL PO Take 1,000 mg by mouth 2 times daily (with meals) 2/7/2017 at Unknown time Yes Unknown, Entered By History

## 2017-02-09 NOTE — PLAN OF CARE
Problem: Goal Outcome Summary  Goal: Goal Outcome Summary  Outcome: No Change  ED admit for blood pressure, NS infusing , distended abdomen, oligouria per due to kidney inury, goes once in days, had nephrology concuslt, VSS except for soft bp this arm,

## 2017-02-17 ENCOUNTER — HOSPITAL ENCOUNTER (EMERGENCY)
Facility: CLINIC | Age: 37
Discharge: HOME | End: 2017-02-17
Attending: EMERGENCY MEDICINE | Admitting: EMERGENCY MEDICINE

## 2017-02-17 VITALS
WEIGHT: 215 LBS | SYSTOLIC BLOOD PRESSURE: 120 MMHG | HEART RATE: 107 BPM | DIASTOLIC BLOOD PRESSURE: 72 MMHG | RESPIRATION RATE: 16 BRPM | OXYGEN SATURATION: 96 % | HEIGHT: 67 IN | TEMPERATURE: 98.4 F | BODY MASS INDEX: 33.74 KG/M2

## 2017-02-17 DIAGNOSIS — E86.0 DEHYDRATION: ICD-10-CM

## 2017-02-17 LAB
ANION GAP SERPL CALCULATED.3IONS-SCNC: 10 MMOL/L (ref 3–14)
BASOPHILS # BLD AUTO: 0 10E9/L (ref 0–0.2)
BASOPHILS NFR BLD AUTO: 0.2 %
BUN SERPL-MCNC: 32 MG/DL (ref 7–30)
CALCIUM SERPL-MCNC: 8.8 MG/DL (ref 8.5–10.1)
CHLORIDE SERPL-SCNC: 111 MMOL/L (ref 94–109)
CO2 SERPL-SCNC: 21 MMOL/L (ref 20–32)
CREAT SERPL-MCNC: 1.25 MG/DL (ref 0.66–1.25)
DIFFERENTIAL METHOD BLD: ABNORMAL
EOSINOPHIL # BLD AUTO: 0 10E9/L (ref 0–0.7)
EOSINOPHIL NFR BLD AUTO: 0.1 %
ERYTHROCYTE [DISTWIDTH] IN BLOOD BY AUTOMATED COUNT: 14.8 % (ref 10–15)
GFR SERPL CREATININE-BSD FRML MDRD: 65 ML/MIN/1.7M2
GLUCOSE SERPL-MCNC: 101 MG/DL (ref 70–99)
HCT VFR BLD AUTO: 33.9 % (ref 40–53)
HGB BLD-MCNC: 11.2 G/DL (ref 13.3–17.7)
IMM GRANULOCYTES # BLD: 0.2 10E9/L (ref 0–0.4)
IMM GRANULOCYTES NFR BLD: 1.8 %
LYMPHOCYTES # BLD AUTO: 4.5 10E9/L (ref 0.8–5.3)
LYMPHOCYTES NFR BLD AUTO: 34.9 %
MCH RBC QN AUTO: 33.2 PG (ref 26.5–33)
MCHC RBC AUTO-ENTMCNC: 33 G/DL (ref 31.5–36.5)
MCV RBC AUTO: 101 FL (ref 78–100)
MONOCYTES # BLD AUTO: 0.8 10E9/L (ref 0–1.3)
MONOCYTES NFR BLD AUTO: 6.4 %
NEUTROPHILS # BLD AUTO: 7.3 10E9/L (ref 1.6–8.3)
NEUTROPHILS NFR BLD AUTO: 56.6 %
NRBC # BLD AUTO: 0 10*3/UL
NRBC BLD AUTO-RTO: 0 /100
PLATELET # BLD AUTO: 286 10E9/L (ref 150–450)
POTASSIUM SERPL-SCNC: 5.1 MMOL/L (ref 3.4–5.3)
RBC # BLD AUTO: 3.37 10E12/L (ref 4.4–5.9)
SODIUM SERPL-SCNC: 142 MMOL/L (ref 133–144)
WBC # BLD AUTO: 12.9 10E9/L (ref 4–11)

## 2017-02-17 PROCEDURE — 96360 HYDRATION IV INFUSION INIT: CPT

## 2017-02-17 PROCEDURE — 93005 ELECTROCARDIOGRAM TRACING: CPT

## 2017-02-17 PROCEDURE — 85025 COMPLETE CBC W/AUTO DIFF WBC: CPT | Performed by: EMERGENCY MEDICINE

## 2017-02-17 PROCEDURE — 25000128 H RX IP 250 OP 636: Performed by: EMERGENCY MEDICINE

## 2017-02-17 PROCEDURE — 99284 EMERGENCY DEPT VISIT MOD MDM: CPT | Mod: 25

## 2017-02-17 PROCEDURE — 80048 BASIC METABOLIC PNL TOTAL CA: CPT | Performed by: EMERGENCY MEDICINE

## 2017-02-17 RX ORDER — SODIUM CHLORIDE 9 MG/ML
1000 INJECTION, SOLUTION INTRAVENOUS CONTINUOUS
Status: DISCONTINUED | OUTPATIENT
Start: 2017-02-17 | End: 2017-02-17 | Stop reason: HOSPADM

## 2017-02-17 RX ADMIN — SODIUM CHLORIDE 1000 ML: 9 INJECTION, SOLUTION INTRAVENOUS at 17:01

## 2017-02-17 ASSESSMENT — ENCOUNTER SYMPTOMS
PALPITATIONS: 0
DIARRHEA: 0
SHORTNESS OF BREATH: 0
ABDOMINAL PAIN: 0
HEADACHES: 1

## 2017-02-17 NOTE — ED PROVIDER NOTES
"  History     Chief Complaint:  Abnormal Labs    HPI:    Bobby Osborne is a 36 year old male with a history of bipolar 1 disorder, chronic kidney disease, diabetes, and hypertension who presents for evaluation of abnormal laboratory results. The patient reports that he was just at his primary care physician's office yesterday. Today when he was informed of the results, he was directed to visit the ED for hyperkalemia of 6.2, per patient. He endorses chronic headaches, but denies any diarrhea, chest pain, palpitations, shortness of breath, or abdominal pain. He also reports a hemorrhoid, but it is not bothering him. He reports he was hospitalized for hyperkalemia and ARF, but was discharged over a week ago. He was at his clinic to have a routine check on his potassium to ensure improvement.    Allergies:  Penicillin     Medications:    Oxycodone HCl  Glipizide  Seroquel  Depakote-ER  Ramelteon     Past Medical History:    Bipolar 1 disorder  Chronic kidney disease  Diabetes  Hypertension    Past Surgical History:    History reviewed. No pertinent surgical history.     Family History:    History reviewed. No pertinent family history.      Social History:  Smoking status: Current Everyday Smoker  Alcohol use: Yes   Marital Status:  Single      Review of Systems   Constitutional:        Hypokalemia   Respiratory: Negative for shortness of breath.    Cardiovascular: Negative for chest pain and palpitations.   Gastrointestinal: Negative for abdominal pain and diarrhea.   Neurological: Positive for headaches.       Physical Exam     Patient Vitals for the past 24 hrs:   BP Temp Temp src Pulse Heart Rate Resp SpO2 Height Weight   02/17/17 1800 - - - - 92 20 - - -   02/17/17 1633 112/61 98.4  F (36.9  C) Oral 107 - 16 96 % 1.702 m (5' 7\") 97.5 kg (215 lb)      Physical Exam:    General:  Sitting on bed.  HENT:  No obvious trauma to head  Right Ear:  External ear normal.   Left Ear:  External ear normal.   Nose:  Nose " normal.   Eyes:  Conjunctivae and EOM are normal. Pupils are equal, round, and reactive.   Neck: Normal range of motion. Neck supple. No tracheal deviation present.   CV:  Normal heart sounds. No murmur heard.  Pulm/Chest: Effort normal and breath sounds normal.   Abd: Soft. No distension. There is no tenderness. There is no rigidity, no rebound and no guarding.   M/S: Normal range of motion.   Neuro: Alert. GCS 15.  Skin: Skin is warm and dry. No rash noted. Not diaphoretic.   Psych: Normal mood and affect. Behavior is normal.      Emergency Department Course     ECG (17:04:05):  Rate 96 bpm. NE interval 130. QRS duration 90. QT/QTc 352/444. P-R-T axes 35- -9-23. Normal sinus rhythm. Normal ECG. Interpreted at 1705 by Jl Fletcher DO.      Right superior axis deviation and ST & T wave abnormality no longer present compared to ECG dated 2/8/2017.     Laboratory:  Basal Metabolic Panel: Chloride 111 (H), Glucose 101 (H), BUN 32 (H), o/w WNL (Creatinine 1.25)  CBC: WBC 12.9 (H), RBC 3.37 (L), HGB 11.2 (L), HCT 33.9 (L),  (H), MCH 33.2 (H), o/w WNL ()       Interventions:  1701- NS 1L IV Bolus     Emergency Department Course:  Past medical records, nursing notes, and vitals reviewed.  1647: I performed an exam of the patient and obtained history, as documented above.    IV inserted and blood drawn.     An ECG was performed.    The above intervention was administered.    1737: I rechecked the patient. ECG and laboratory findings and plan explained to the Patient. Patient discharged home with instructions regarding supportive care, medications, and reasons to return. The importance of close follow-up was reviewed.     Impression & Plan      Medical Decision Making:  Bobby Osborne is a very pleasant 36 year old male who presents because he was told he has hyperkalemia. The patient was recently hospitalized from acute renal failure, which was determined to be caused by chronic NSAID use. The  patient had his blood drawn yesterday and was told this morning he was hyperkalemic. The patient has no symptoms. His ECG showed no peak T waves. His basic labs were unremarkable; his potassium was found to be at the higher end of normal. He has a normal creatine and his BUN was slightly elevated, consistent with mild dehydration. He was provided IV fluids for this. The patient again denies any medical concerns. He has absolutely no abdominal pain, chest pain, or palpitations, so I do not believe any additional testing is necessary. The patient feels comfortable going home and following up with his primary doctor. He was instructed to return for palpations or any other concerns. He should have his potassium rechecked in 3-4 days.    The treatment plan was discussed with the patient and they expressed understanding of this plan and consented to the plan.  In addition, the patient will return to the emergency department if their symptoms persist, worsen, if new symptoms arise or if there is any concern as other pathology may be present that is not evident at this time. They also understand the importance of close follow up in the clinic and if unable to do so will return to the emergency department for a reevaluation. All questions were answered.     Diagnosis:    ICD-10-CM   1. Dehydration E86.0     Symone Muro  2/17/2017    EMERGENCY DEPARTMENT    ISymone, am serving as a scribe at 4:47 PM on 2/17/2017 to document services personally performed by Jl Fletcher DO based on my observations and the provider's statements to me.       Jl Fletcher DO  02/17/17 2013

## 2017-02-17 NOTE — ED AVS SNAPSHOT
Emergency Department    [address] [facility] [address]    Phone:  [phone]    Fax:  [phone]                                       Bobby Osborne   MRN: [id number]    Department:   Emergency Department   Date of Visit:  2/17/2017           Patient Information     Date Of Birth          1980        Your diagnoses for this visit were:     Dehydration        You were seen by Jl Fletcher DO.      Follow-up Information     Follow up with Oleg Bo In 3 days.    Specialty:  Family Practice    Contact information:    [facility]  [address]  [facility] [address]  [phone]          Follow up with  Emergency Department.    Specialty:  EMERGENCY MEDICINE    Why:  If symptoms worsen    Contact information:    [address] [facility] [address] [phone]        Discharge Instructions         Dehydration (Adult)  Dehydration occurs when your body loses too much fluid. This may be the result of prolonged vomiting or diarrhea, excessive sweating, or a high fever. It may also happen if you don t drink enough fluid when you re sick or out in the heat. Misuse of diuretics (water pills) can also be a cause.  Symptoms include thirst and decreased urine output. You may also feel dizzy, weak, fatigued, or very drowsy. The diet described below is usually enough to treat dehydration. In some cases, you may need medicine.  Home care    Drink at least 12 8-ounce glasses of fluid every day to resolve the dehydration. Fluid may include water; orange juice; lemonade; apple, grape, or cranberry juice; clear fruit drinks; electrolyte replacement and sports drinks; and teas and coffee without caffeine. If you have been diagnosed with a kidney disease, ask your doctor how much and what types of fluids you should drink to prevent dehydration. If you have kidney disease, fluid can build up in the body. This can be dangerous to your health.     If you have a  fever, muscle aches, or a headache as a result of a cold or flu, you may take acetaminophen or ibuprofen, unless another medicine was prescribed. If you have chronic liver or kidney disease, or have ever had a stomach ulcer or gastrointestinal bleeding, talk with your health care provider before using these medicines. Don't take aspirin if you are younger than 18 and have a fever. Aspirin raises the chance for severe liver injury.  Follow-up care  Follow up with your health care provider, or as advised.  When to seek medical advice  Call your health care provider right away if any of these occur:    Continued vomiting    Frequent diarrhea (more than 5 times a day); blood (red or black color) or mucus in diarrhea    Blood in vomit or stool    Swollen abdomen or increasing abdominal pain    Weakness, dizziness, or fainting    Unusual drowsiness or confusion    Reduced urine output or extreme thirst    Fever of 100.4 F (34 C) or higher     3411-8293 [employer]. [address] [place] [address]. All rights reserved. This information is not intended as a substitute for professional medical care. Always follow your healthcare professional's instructions.        Hyperkalemia  Hyperkalemia is too much potassium in the blood. This most often occurs in persons who take certain medicines or persons with kidney disease.  This condition often has no symptoms until levels of potassium become high. If symptoms do occur, they include muscle weakness and changes in the heartbeat. A blood test is done to diagnose the problem. An ECG (electrocardiogram) may also be done to test the heartbeat.  If hyperkalemia is caused by a medicine, the healthcare provider may lower the dose or switch to a different medicine. A low-potassium diet may also be prescribed.   Home care    Be sure your healthcare provider knows about all of the medicines you take. Follow your healthcare provider's advice about making changes to  your medicines.    If a low-potassium diet has been prescribed, follow this closely. If you need help, ask to be referred to a dietitian for advice on how to follow this diet.  Follow-up care  Follow up with your healthcare provider. You may need a repeat blood test within the next 7 days. Schedule this as advised.  When to seek medical advice  Call your healthcare provider if any of the following occur:    Weakness, dizziness, or lightheadedness    Nausea, vomiting, or diarrhea    Urinating only in small amounts or not urinating    Symptoms don't go away or get worse  Call 911  Call 911 or emergency services right away if any of the following occur:    Fast or irregular heartbeat    Fainting    Severe shortness of breath    Chest, arm, shoulder, neck or upper back pain    Trouble controlling your muscles    0860-7475 [employer]. [address] [place] [address]. All rights reserved. This information is not intended as a substitute for professional medical care. Always follow your healthcare professional's instructions.          24 Hour Appointment Hotline       To make an appointment at any [facility], call [phone] ([phone]). If you don't have a family doctor or clinic, we will help you find one. [facility] clinics are conveniently located to serve the needs of you and your family.             Review of your medicines      Our records show that you are taking the medicines listed below. If these are incorrect, please call your family doctor or clinic.        Dose / Directions Last dose taken    divalproex 500 MG 24 hr tablet   Commonly known as:  DEPAKOTE ER   Dose:  1500 mg        Take 1,500 mg by mouth At Bedtime   Refills:  0        glipiZIDE 5 MG 24 hr tablet   Commonly known as:  glipiZIDE XL   Dose:  5 mg   Quantity:  30 tablet        Take 1 tablet (5 mg) by mouth daily   Refills:  0        OXYCODONE HCL PO   Dose:  5 mg        Take 5 mg by mouth every 6 hours as  needed (pain. max of 4/day)   Refills:  0        ramelteon 8 MG tablet   Commonly known as:  ROZEREM   Dose:  8 mg        Take 8 mg by mouth At Bedtime   Refills:  0        SEROQUEL PO   Dose:  600 mg        Take 600 mg by mouth At Bedtime   Refills:  0                Procedures and tests performed during your visit     Basic metabolic panel    CBC with platelets differential    EKG 12-lead, tracing only      Orders Needing Specimen Collection     None      Pending Results     Date and Time Order Name Status Description    2/17/2017 1646 EKG 12-lead, tracing only Preliminary             Pending Culture Results     No orders found from 2/15/2017 to 2/18/2017.             Test Results from your hospital stay     2/17/2017  5:15 PM - Interface, Flexilab Results      Component Results     Component Value Ref Range & Units Status    WBC 12.9 (H) 4.0 - 11.0 10e9/L Final    RBC Count 3.37 (L) 4.4 - 5.9 10e12/L Final    Hemoglobin 11.2 (L) 13.3 - 17.7 g/dL Final    Hematocrit 33.9 (L) 40.0 - 53.0 % Final     (H) 78 - 100 fl Final    MCH 33.2 (H) 26.5 - 33.0 pg Final    MCHC 33.0 31.5 - 36.5 g/dL Final    RDW 14.8 10.0 - 15.0 % Final    Platelet Count 286 150 - 450 10e9/L Final    Diff Method Automated Method  Final    % Neutrophils 56.6 % Final    % Lymphocytes 34.9 % Final    % Monocytes 6.4 % Final    % Eosinophils 0.1 % Final    % Basophils 0.2 % Final    % Immature Granulocytes 1.8 % Final    Nucleated RBCs 0 0 /100 Final    Absolute Neutrophil 7.3 1.6 - 8.3 10e9/L Final    Absolute Lymphocytes 4.5 0.8 - 5.3 10e9/L Final    Absolute Monocytes 0.8 0.0 - 1.3 10e9/L Final    Absolute Eosinophils 0.0 0.0 - 0.7 10e9/L Final    Absolute Basophils 0.0 0.0 - 0.2 10e9/L Final    Abs Immature Granulocytes 0.2 0 - 0.4 10e9/L Final    Absolute Nucleated RBC 0.0  Final         2/17/2017  5:29 PM - Interface, Flexilab Results      Component Results     Component Value Ref Range & Units Status    Sodium 142 133 - 144 mmol/L  Final    Potassium 5.1 3.4 - 5.3 mmol/L Final    Chloride 111 (H) 94 - 109 mmol/L Final    Carbon Dioxide 21 20 - 32 mmol/L Final    Anion Gap 10 3 - 14 mmol/L Final    Glucose 101 (H) 70 - 99 mg/dL Final    Urea Nitrogen 32 (H) 7 - 30 mg/dL Final    Creatinine 1.25 0.66 - 1.25 mg/dL Final    GFR Estimate 65 >60 mL/min/1.7m2 Final    Non  GFR Calc    GFR Estimate If Black 79 >60 mL/min/1.7m2 Final    African American GFR Calc    Calcium 8.8 8.5 - 10.1 mg/dL Final                Clinical Quality Measure: Blood Pressure Screening     Your blood pressure was checked while you were in the emergency department today. The last reading we obtained was  BP: 112/61 . Please read the guidelines below about what these numbers mean and what you should do about them.  If your systolic blood pressure (the top number) is less than 120 and your diastolic blood pressure (the bottom number) is less than 80, then your blood pressure is normal. There is nothing more that you need to do about it.  If your systolic blood pressure (the top number) is 120-139 or your diastolic blood pressure (the bottom number) is 80-89, your blood pressure may be higher than it should be. You should have your blood pressure rechecked within a year by a primary care provider.  If your systolic blood pressure (the top number) is 140 or greater or your diastolic blood pressure (the bottom number) is 90 or greater, you may have high blood pressure. High blood pressure is treatable, but if left untreated over time it can put you at risk for heart attack, stroke, or kidney failure. You should have your blood pressure rechecked by a primary care provider within the next 4 weeks.  If your provider in the emergency department today gave you specific instructions to follow-up with your doctor or provider even sooner than that, you should follow that instruction and not wait for up to 4 weeks for your follow-up visit.        Thank you for choosing  "[facility]       Thank you for choosing [facility] for your care. Our goal is always to provide you with excellent care. Hearing back from our patients is one way we can continue to improve our services. Please take a few minutes to complete the written survey that you may receive in the mail after you visit with us. Thank you!        [email or web address] Information     [employer] lets you send messages to your doctor, view your test results, renew your prescriptions, schedule appointments and more. To sign up, go to [email or web address] . Click on \"Log in\" on the left side of the screen, which will take you to the Welcome page. Then click on \"Sign up Now\" on the right side of the page.     You will be asked to enter the access code listed below, as well as some personal information. Please follow the directions to create your username and password.     Your access code is: [email or web address]  Expires: 2017 11:05 PM     Your access code will  in 90 days. If you need help or a new code, please call your [facility] clinic or [phone].        Care EveryWhere ID     This is your Care EveryWhere ID. This could be used by other organizations to access your [facility] medical records  [id number]        After Visit Summary       This is your record. Keep this with you and show to your community pharmacist(s) and doctor(s) at your next visit.                  "

## 2017-02-17 NOTE — ED AVS SNAPSHOT
Emergency Department    [address] [place] [address]    Phone:  [phone]    Fax:  [phone]                                       Bobby Osborne   MRN: [id number]    Department:   Emergency Department   Date of Visit:  2/17/2017           After Visit Summary Signature Page     I have received my discharge instructions, and my questions have been answered. I have discussed any challenges I see with this plan with the nurse or doctor.    ..........................................................................................................................................  Patient/Patient Representative Signature      ..........................................................................................................................................  Patient Representative Print Name and Relationship to Patient    ..................................................               ................................................  Date                                            Time    ..........................................................................................................................................  Reviewed by Signature/Title    ...................................................              ..............................................  Date                                                            Time

## 2017-02-17 NOTE — DISCHARGE INSTRUCTIONS
Dehydration (Adult)  Dehydration occurs when your body loses too much fluid. This may be the result of prolonged vomiting or diarrhea, excessive sweating, or a high fever. It may also happen if you don t drink enough fluid when you re sick or out in the heat. Misuse of diuretics (water pills) can also be a cause.  Symptoms include thirst and decreased urine output. You may also feel dizzy, weak, fatigued, or very drowsy. The diet described below is usually enough to treat dehydration. In some cases, you may need medicine.  Home care    Drink at least 12 8-ounce glasses of fluid every day to resolve the dehydration. Fluid may include water; orange juice; lemonade; apple, grape, or cranberry juice; clear fruit drinks; electrolyte replacement and sports drinks; and teas and coffee without caffeine. If you have been diagnosed with a kidney disease, ask your doctor how much and what types of fluids you should drink to prevent dehydration. If you have kidney disease, fluid can build up in the body. This can be dangerous to your health.     If you have a fever, muscle aches, or a headache as a result of a cold or flu, you may take acetaminophen or ibuprofen, unless another medicine was prescribed. If you have chronic liver or kidney disease, or have ever had a stomach ulcer or gastrointestinal bleeding, talk with your health care provider before using these medicines. Don't take aspirin if you are younger than 18 and have a fever. Aspirin raises the chance for severe liver injury.  Follow-up care  Follow up with your health care provider, or as advised.  When to seek medical advice  Call your health care provider right away if any of these occur:    Continued vomiting    Frequent diarrhea (more than 5 times a day); blood (red or black color) or mucus in diarrhea    Blood in vomit or stool    Swollen abdomen or increasing abdominal pain    Weakness, dizziness, or fainting    Unusual drowsiness or confusion    Reduced  urine output or extreme thirst    Fever of 100.4 F (34 C) or higher     4303-2957 [employer]. [address] [place] [address], [place] [address]. All rights reserved. This information is not intended as a substitute for professional medical care. Always follow your healthcare professional's instructions.        Hyperkalemia  Hyperkalemia is too much potassium in the blood. This most often occurs in persons who take certain medicines or persons with kidney disease.  This condition often has no symptoms until levels of potassium become high. If symptoms do occur, they include muscle weakness and changes in the heartbeat. A blood test is done to diagnose the problem. An ECG (electrocardiogram) may also be done to test the heartbeat.  If hyperkalemia is caused by a medicine, the healthcare provider may lower the dose or switch to a different medicine. A low-potassium diet may also be prescribed.   Home care    Be sure your healthcare provider knows about all of the medicines you take. Follow your healthcare provider's advice about making changes to your medicines.    If a low-potassium diet has been prescribed, follow this closely. If you need help, ask to be referred to a dietitian for advice on how to follow this diet.  Follow-up care  Follow up with your healthcare provider. You may need a repeat blood test within the next 7 days. Schedule this as advised.  When to seek medical advice  Call your healthcare provider if any of the following occur:    Weakness, dizziness, or lightheadedness    Nausea, vomiting, or diarrhea    Urinating only in small amounts or not urinating    Symptoms don't go away or get worse  Call 911  Call 911 or emergency services right away if any of the following occur:    Fast or irregular heartbeat    Fainting    Severe shortness of breath    Chest, arm, shoulder, neck or upper back pain    Trouble controlling your muscles    4738-2755 [employer]. [address] [place] [address],  [place] [address]. All rights reserved. This information is not intended as a substitute for professional medical care. Always follow your healthcare professional's instructions.

## 2017-02-18 LAB — INTERPRETATION ECG - MUSE: NORMAL
